# Patient Record
Sex: FEMALE | Race: WHITE | URBAN - METROPOLITAN AREA
[De-identification: names, ages, dates, MRNs, and addresses within clinical notes are randomized per-mention and may not be internally consistent; named-entity substitution may affect disease eponyms.]

---

## 2022-06-10 ENCOUNTER — OFFICE VISIT (OUTPATIENT)
Dept: URBAN - METROPOLITAN AREA CLINIC 82 | Facility: CLINIC | Age: 62
End: 2022-06-10
Payer: COMMERCIAL

## 2022-06-10 DIAGNOSIS — H52.4 PRESBYOPIA: Primary | ICD-10-CM

## 2022-06-10 PROCEDURE — 92004 COMPRE OPH EXAM NEW PT 1/>: CPT | Performed by: OPTOMETRIST

## 2022-06-10 PROCEDURE — 92082 INTERMEDIATE VISUAL FIELD XM: CPT | Performed by: OPTOMETRIST

## 2022-06-10 ASSESSMENT — INTRAOCULAR PRESSURE
OS: 15
OD: 12

## 2022-06-10 ASSESSMENT — KERATOMETRY
OD: 44.00
OS: 44.00

## 2022-06-10 ASSESSMENT — VISUAL ACUITY
OS: 20/25
OD: 20/25

## 2022-06-10 NOTE — IMPRESSION/PLAN
Impression: Presbyopia: H52.4. Plan: New glasses RX given today. All signs and risks of retinal detachment were discussed in detail. Patient instructed to call the office immediately if any symptoms noted. Discussed diagnosis of cataracts OU in detail with patient. No treatment currently recommended. The patient will monitor vision changes and contact us with any decrease in vision.